# Patient Record
Sex: FEMALE | Race: BLACK OR AFRICAN AMERICAN | NOT HISPANIC OR LATINO
[De-identification: names, ages, dates, MRNs, and addresses within clinical notes are randomized per-mention and may not be internally consistent; named-entity substitution may affect disease eponyms.]

---

## 2019-09-26 ENCOUNTER — APPOINTMENT (OUTPATIENT)
Dept: ANTEPARTUM | Facility: CLINIC | Age: 29
End: 2019-09-26

## 2019-09-26 ENCOUNTER — APPOINTMENT (OUTPATIENT)
Dept: MATERNAL FETAL MEDICINE | Facility: CLINIC | Age: 29
End: 2019-09-26
Payer: MEDICAID

## 2019-09-26 ENCOUNTER — ASOB RESULT (OUTPATIENT)
Age: 29
End: 2019-09-26

## 2019-09-26 ENCOUNTER — APPOINTMENT (OUTPATIENT)
Dept: ANTEPARTUM | Facility: CLINIC | Age: 29
End: 2019-09-26
Payer: MEDICAID

## 2019-09-26 ENCOUNTER — APPOINTMENT (OUTPATIENT)
Dept: MATERNAL FETAL MEDICINE | Facility: CLINIC | Age: 29
End: 2019-09-26

## 2019-09-26 PROCEDURE — 99214 OFFICE O/P EST MOD 30 MIN: CPT

## 2019-09-26 PROCEDURE — 76805 OB US >/= 14 WKS SNGL FETUS: CPT

## 2019-10-01 ENCOUNTER — APPOINTMENT (OUTPATIENT)
Dept: ANTEPARTUM | Facility: CLINIC | Age: 29
End: 2019-10-01

## 2019-10-02 LAB
2ND TRIMESTER DATA: NORMAL
AFP PNL SERPL: NORMAL
AFP SERPL-ACNC: NORMAL
B-HCG FREE SERPL-MCNC: NORMAL
CLINICAL BIOCHEMIST REVIEW: NORMAL
INHIBIN A SERPL-MCNC: NORMAL
NOTES NTD: NORMAL
U ESTRIOL SERPL-SCNC: NORMAL

## 2019-10-29 ENCOUNTER — ASOB RESULT (OUTPATIENT)
Age: 29
End: 2019-10-29

## 2019-10-29 ENCOUNTER — APPOINTMENT (OUTPATIENT)
Dept: ANTEPARTUM | Facility: CLINIC | Age: 29
End: 2019-10-29
Payer: MEDICAID

## 2019-10-29 PROCEDURE — 76805 OB US >/= 14 WKS SNGL FETUS: CPT

## 2019-11-15 ENCOUNTER — EMERGENCY (EMERGENCY)
Facility: HOSPITAL | Age: 29
LOS: 0 days | Discharge: PSYCHIATRIC OR SUBSTANCE USE FACILITY | End: 2019-11-15
Attending: EMERGENCY MEDICINE
Payer: MEDICAID

## 2019-11-15 ENCOUNTER — INPATIENT (INPATIENT)
Facility: HOSPITAL | Age: 29
LOS: 0 days | Discharge: HOME | DRG: 833 | End: 2019-11-15
Attending: OBSTETRICS & GYNECOLOGY | Admitting: INTERNAL MEDICINE
Payer: MEDICAID

## 2019-11-15 ENCOUNTER — TRANSCRIPTION ENCOUNTER (OUTPATIENT)
Age: 29
End: 2019-11-15

## 2019-11-15 VITALS
RESPIRATION RATE: 16 BRPM | SYSTOLIC BLOOD PRESSURE: 105 MMHG | HEART RATE: 97 BPM | DIASTOLIC BLOOD PRESSURE: 69 MMHG | OXYGEN SATURATION: 100 %

## 2019-11-15 VITALS
WEIGHT: 154.32 LBS | RESPIRATION RATE: 18 BRPM | HEIGHT: 65 IN | SYSTOLIC BLOOD PRESSURE: 106 MMHG | HEART RATE: 99 BPM | DIASTOLIC BLOOD PRESSURE: 67 MMHG | OXYGEN SATURATION: 99 %

## 2019-11-15 VITALS
TEMPERATURE: 98 F | RESPIRATION RATE: 20 BRPM | HEART RATE: 101 BPM | SYSTOLIC BLOOD PRESSURE: 109 MMHG | DIASTOLIC BLOOD PRESSURE: 63 MMHG | OXYGEN SATURATION: 99 %

## 2019-11-15 VITALS
OXYGEN SATURATION: 99 % | WEIGHT: 154.98 LBS | HEIGHT: 65 IN | RESPIRATION RATE: 20 BRPM | SYSTOLIC BLOOD PRESSURE: 125 MMHG | TEMPERATURE: 98 F | HEART RATE: 199 BPM | DIASTOLIC BLOOD PRESSURE: 69 MMHG

## 2019-11-15 DIAGNOSIS — R79.89 OTHER SPECIFIED ABNORMAL FINDINGS OF BLOOD CHEMISTRY: ICD-10-CM

## 2019-11-15 DIAGNOSIS — I45.6 PRE-EXCITATION SYNDROME: ICD-10-CM

## 2019-11-15 DIAGNOSIS — J45.909 UNSPECIFIED ASTHMA, UNCOMPLICATED: ICD-10-CM

## 2019-11-15 DIAGNOSIS — I47.1 SUPRAVENTRICULAR TACHYCARDIA: ICD-10-CM

## 2019-11-15 DIAGNOSIS — R00.2 PALPITATIONS: ICD-10-CM

## 2019-11-15 LAB
ALBUMIN SERPL ELPH-MCNC: 2.8 G/DL — LOW (ref 3.3–5)
ALBUMIN SERPL ELPH-MCNC: 3.3 G/DL — SIGNIFICANT CHANGE UP (ref 3.3–5)
ALP SERPL-CCNC: 43 U/L — SIGNIFICANT CHANGE UP (ref 40–120)
ALP SERPL-CCNC: 48 U/L — SIGNIFICANT CHANGE UP (ref 40–120)
ALT FLD-CCNC: 13 U/L — SIGNIFICANT CHANGE UP (ref 10–45)
ALT FLD-CCNC: 20 U/L — SIGNIFICANT CHANGE UP (ref 12–78)
ANION GAP SERPL CALC-SCNC: 12 MMOL/L — SIGNIFICANT CHANGE UP (ref 5–17)
ANION GAP SERPL CALC-SCNC: 6 MMOL/L — SIGNIFICANT CHANGE UP (ref 5–17)
APTT BLD: 25 SEC — LOW (ref 27.5–36.3)
APTT BLD: 25.5 SEC — LOW (ref 28.5–37)
AST SERPL-CCNC: 17 U/L — SIGNIFICANT CHANGE UP (ref 15–37)
AST SERPL-CCNC: 21 U/L — SIGNIFICANT CHANGE UP (ref 10–40)
BASOPHILS # BLD AUTO: 0.03 K/UL — SIGNIFICANT CHANGE UP (ref 0–0.2)
BASOPHILS # BLD AUTO: 0.05 K/UL — SIGNIFICANT CHANGE UP (ref 0–0.2)
BASOPHILS NFR BLD AUTO: 0.5 % — SIGNIFICANT CHANGE UP (ref 0–2)
BASOPHILS NFR BLD AUTO: 0.6 % — SIGNIFICANT CHANGE UP (ref 0–2)
BILIRUB SERPL-MCNC: 0.1 MG/DL — LOW (ref 0.2–1.2)
BILIRUB SERPL-MCNC: 0.2 MG/DL — SIGNIFICANT CHANGE UP (ref 0.2–1.2)
BLD GP AB SCN SERPL QL: NEGATIVE — SIGNIFICANT CHANGE UP
BUN SERPL-MCNC: 6 MG/DL — LOW (ref 7–23)
BUN SERPL-MCNC: 8 MG/DL — SIGNIFICANT CHANGE UP (ref 7–23)
CALCIUM SERPL-MCNC: 8.5 MG/DL — SIGNIFICANT CHANGE UP (ref 8.4–10.5)
CALCIUM SERPL-MCNC: 9 MG/DL — SIGNIFICANT CHANGE UP (ref 8.5–10.1)
CHLORIDE SERPL-SCNC: 108 MMOL/L — SIGNIFICANT CHANGE UP (ref 96–108)
CHLORIDE SERPL-SCNC: 110 MMOL/L — HIGH (ref 96–108)
CO2 SERPL-SCNC: 22 MMOL/L — SIGNIFICANT CHANGE UP (ref 22–31)
CO2 SERPL-SCNC: 24 MMOL/L — SIGNIFICANT CHANGE UP (ref 22–31)
CREAT SERPL-MCNC: 0.58 MG/DL — SIGNIFICANT CHANGE UP (ref 0.5–1.3)
CREAT SERPL-MCNC: 0.69 MG/DL — SIGNIFICANT CHANGE UP (ref 0.5–1.3)
EOSINOPHIL # BLD AUTO: 0.11 K/UL — SIGNIFICANT CHANGE UP (ref 0–0.5)
EOSINOPHIL # BLD AUTO: 0.16 K/UL — SIGNIFICANT CHANGE UP (ref 0–0.5)
EOSINOPHIL NFR BLD AUTO: 1.7 % — SIGNIFICANT CHANGE UP (ref 0–6)
EOSINOPHIL NFR BLD AUTO: 2.1 % — SIGNIFICANT CHANGE UP (ref 0–6)
GLUCOSE BLDC GLUCOMTR-MCNC: 67 MG/DL — LOW (ref 70–99)
GLUCOSE BLDC GLUCOMTR-MCNC: 68 MG/DL — LOW (ref 70–99)
GLUCOSE SERPL-MCNC: 73 MG/DL — SIGNIFICANT CHANGE UP (ref 70–99)
GLUCOSE SERPL-MCNC: 92 MG/DL — SIGNIFICANT CHANGE UP (ref 70–99)
HCT VFR BLD CALC: 32.5 % — LOW (ref 34.5–45)
HCT VFR BLD CALC: 36.2 % — SIGNIFICANT CHANGE UP (ref 34.5–45)
HGB BLD-MCNC: 11.1 G/DL — LOW (ref 11.5–15.5)
HGB BLD-MCNC: 12 G/DL — SIGNIFICANT CHANGE UP (ref 11.5–15.5)
IMM GRANULOCYTES NFR BLD AUTO: 0.4 % — SIGNIFICANT CHANGE UP (ref 0–1.5)
IMM GRANULOCYTES NFR BLD AUTO: 0.6 % — SIGNIFICANT CHANGE UP (ref 0–1.5)
INR BLD: 0.92 RATIO — SIGNIFICANT CHANGE UP (ref 0.88–1.16)
INR BLD: 0.93 RATIO — SIGNIFICANT CHANGE UP (ref 0.88–1.16)
LYMPHOCYTES # BLD AUTO: 1.7 K/UL — SIGNIFICANT CHANGE UP (ref 1–3.3)
LYMPHOCYTES # BLD AUTO: 2.14 K/UL — SIGNIFICANT CHANGE UP (ref 1–3.3)
LYMPHOCYTES # BLD AUTO: 26.6 % — SIGNIFICANT CHANGE UP (ref 13–44)
LYMPHOCYTES # BLD AUTO: 27.6 % — SIGNIFICANT CHANGE UP (ref 13–44)
MCHC RBC-ENTMCNC: 30.5 PG — SIGNIFICANT CHANGE UP (ref 27–34)
MCHC RBC-ENTMCNC: 31.4 PG — SIGNIFICANT CHANGE UP (ref 27–34)
MCHC RBC-ENTMCNC: 33.1 GM/DL — SIGNIFICANT CHANGE UP (ref 32–36)
MCHC RBC-ENTMCNC: 34.2 GM/DL — SIGNIFICANT CHANGE UP (ref 32–36)
MCV RBC AUTO: 92.1 FL — SIGNIFICANT CHANGE UP (ref 80–100)
MCV RBC AUTO: 92.1 FL — SIGNIFICANT CHANGE UP (ref 80–100)
MONOCYTES # BLD AUTO: 0.55 K/UL — SIGNIFICANT CHANGE UP (ref 0–0.9)
MONOCYTES # BLD AUTO: 0.7 K/UL — SIGNIFICANT CHANGE UP (ref 0–0.9)
MONOCYTES NFR BLD AUTO: 8.6 % — SIGNIFICANT CHANGE UP (ref 2–14)
MONOCYTES NFR BLD AUTO: 9 % — SIGNIFICANT CHANGE UP (ref 2–14)
NEUTROPHILS # BLD AUTO: 3.96 K/UL — SIGNIFICANT CHANGE UP (ref 1.8–7.4)
NEUTROPHILS # BLD AUTO: 4.66 K/UL — SIGNIFICANT CHANGE UP (ref 1.8–7.4)
NEUTROPHILS NFR BLD AUTO: 60.3 % — SIGNIFICANT CHANGE UP (ref 43–77)
NEUTROPHILS NFR BLD AUTO: 62 % — SIGNIFICANT CHANGE UP (ref 43–77)
NRBC # BLD: 0 /100 WBCS — SIGNIFICANT CHANGE UP (ref 0–0)
NRBC # BLD: 0 /100 WBCS — SIGNIFICANT CHANGE UP (ref 0–0)
NT-PROBNP SERPL-SCNC: 413 PG/ML — HIGH (ref 0–300)
PLATELET # BLD AUTO: 224 K/UL — SIGNIFICANT CHANGE UP (ref 150–400)
PLATELET # BLD AUTO: 256 K/UL — SIGNIFICANT CHANGE UP (ref 150–400)
POTASSIUM SERPL-MCNC: 4 MMOL/L — SIGNIFICANT CHANGE UP (ref 3.5–5.3)
POTASSIUM SERPL-MCNC: 4.1 MMOL/L — SIGNIFICANT CHANGE UP (ref 3.5–5.3)
POTASSIUM SERPL-SCNC: 4 MMOL/L — SIGNIFICANT CHANGE UP (ref 3.5–5.3)
POTASSIUM SERPL-SCNC: 4.1 MMOL/L — SIGNIFICANT CHANGE UP (ref 3.5–5.3)
PROT SERPL-MCNC: 6.3 G/DL — SIGNIFICANT CHANGE UP (ref 6–8.3)
PROT SERPL-MCNC: 6.7 GM/DL — SIGNIFICANT CHANGE UP (ref 6–8.3)
PROTHROM AB SERPL-ACNC: 10.4 SEC — SIGNIFICANT CHANGE UP (ref 10–12.9)
PROTHROM AB SERPL-ACNC: 10.6 SEC — SIGNIFICANT CHANGE UP (ref 10–12.9)
RBC # BLD: 3.53 M/UL — LOW (ref 3.8–5.2)
RBC # BLD: 3.93 M/UL — SIGNIFICANT CHANGE UP (ref 3.8–5.2)
RBC # FLD: 12.9 % — SIGNIFICANT CHANGE UP (ref 10.3–14.5)
RBC # FLD: 13.2 % — SIGNIFICANT CHANGE UP (ref 10.3–14.5)
RH IG SCN BLD-IMP: POSITIVE — SIGNIFICANT CHANGE UP
SODIUM SERPL-SCNC: 140 MMOL/L — SIGNIFICANT CHANGE UP (ref 135–145)
SODIUM SERPL-SCNC: 142 MMOL/L — SIGNIFICANT CHANGE UP (ref 135–145)
TROPONIN I SERPL-MCNC: 0.14 NG/ML — HIGH (ref 0.01–0.04)
TROPONIN T, HIGH SENSITIVITY RESULT: 42 NG/L — SIGNIFICANT CHANGE UP (ref 0–51)
TSH SERPL-MCNC: 2.35 UIU/ML — SIGNIFICANT CHANGE UP (ref 0.36–3.74)
WBC # BLD: 6.39 K/UL — SIGNIFICANT CHANGE UP (ref 3.8–10.5)
WBC # BLD: 7.74 K/UL — SIGNIFICANT CHANGE UP (ref 3.8–10.5)
WBC # FLD AUTO: 6.39 K/UL — SIGNIFICANT CHANGE UP (ref 3.8–10.5)
WBC # FLD AUTO: 7.74 K/UL — SIGNIFICANT CHANGE UP (ref 3.8–10.5)

## 2019-11-15 PROCEDURE — 93010 ELECTROCARDIOGRAM REPORT: CPT

## 2019-11-15 PROCEDURE — 86900 BLOOD TYPING SEROLOGIC ABO: CPT

## 2019-11-15 PROCEDURE — 83880 ASSAY OF NATRIURETIC PEPTIDE: CPT

## 2019-11-15 PROCEDURE — 82962 GLUCOSE BLOOD TEST: CPT

## 2019-11-15 PROCEDURE — 99285 EMERGENCY DEPT VISIT HI MDM: CPT

## 2019-11-15 PROCEDURE — 86850 RBC ANTIBODY SCREEN: CPT

## 2019-11-15 PROCEDURE — 80053 COMPREHEN METABOLIC PANEL: CPT

## 2019-11-15 PROCEDURE — 93005 ELECTROCARDIOGRAM TRACING: CPT

## 2019-11-15 PROCEDURE — 86901 BLOOD TYPING SEROLOGIC RH(D): CPT

## 2019-11-15 PROCEDURE — 84484 ASSAY OF TROPONIN QUANT: CPT

## 2019-11-15 PROCEDURE — 85027 COMPLETE CBC AUTOMATED: CPT

## 2019-11-15 PROCEDURE — G0378: CPT

## 2019-11-15 PROCEDURE — 99231 SBSQ HOSP IP/OBS SF/LOW 25: CPT

## 2019-11-15 PROCEDURE — 85610 PROTHROMBIN TIME: CPT

## 2019-11-15 PROCEDURE — 99285 EMERGENCY DEPT VISIT HI MDM: CPT | Mod: 25

## 2019-11-15 PROCEDURE — 85730 THROMBOPLASTIN TIME PARTIAL: CPT

## 2019-11-15 RX ORDER — FOLIC ACID 0.8 MG
1 TABLET ORAL DAILY
Refills: 0 | Status: DISCONTINUED | OUTPATIENT
Start: 2019-11-15 | End: 2019-11-15

## 2019-11-15 RX ORDER — SODIUM CHLORIDE 9 MG/ML
1000 INJECTION INTRAMUSCULAR; INTRAVENOUS; SUBCUTANEOUS ONCE
Refills: 0 | Status: COMPLETED | OUTPATIENT
Start: 2019-11-15 | End: 2019-11-15

## 2019-11-15 RX ADMIN — SODIUM CHLORIDE 33.33 MILLILITER(S): 9 INJECTION INTRAMUSCULAR; INTRAVENOUS; SUBCUTANEOUS at 03:55

## 2019-11-15 NOTE — DISCHARGE NOTE PROVIDER - NSDCFUSCHEDAPPT_GEN_ALL_CORE_FT
MAK FREEMAN ; 12/16/2019 ; NPP Cardio Electro [address] MAK Christy ; 12/19/2019 ; NPP Antepartum [address]  MAK FREEMAN ; 01/22/2020 ; NPP Antepartum [address]

## 2019-11-15 NOTE — ED ADULT NURSE NOTE - OBJECTIVE STATEMENT
Patient presents in ED complaining of palpitations x 6hrs, Patient is pregnant, , HASMUKH 3/16/20. Patient confirms fetal movements , denies any abdominal pain or cramping.

## 2019-11-15 NOTE — H&P ADULT - PROBLEM SELECTOR PLAN 1
- Appreciate EPS recs: tele monitoring, vagal maneuvers, TTE, TSH, K>4, Mg >2  - Daily FH checks  - PNVs, folic acid  - MFM following    SWATHI Jones PGY2  d/w T Shannan PGY3  Dr. Frederick mccall

## 2019-11-15 NOTE — H&P ADULT - HISTORY OF PRESENT ILLNESS
29 y/o  at 22w4d (EDC 3/16/19) tx from [place] for SVT. Patient with known hx of WPW syndrome diagnosed 2 years ago after 5 minute episodes of palpitations, seen by unknown cardiologist, had stress test reportedly normal. No medications since. No symptoms since. Patient had episode of tachycardia last night at 9pm that persisted, also with dizziness and SOB otherwise no associated symptoms, went to the ED at 2am, ECG showed SVT with short RP to 190s. Symptoms resolved with conservative management blowing into syringe and IVF around 5am. No medications pushed per patient. Patient currently denies dizziness, fevers, chills, headache, chest pain, palpitations, shortness of breath, abdominal pain, urinary symptoms, changes in bowel movements. Patient denies OB complaints of vaginal bleeding, contractions, loss of fluid. Reports positive fetal movement. Fetal heart rate check performed in ED, found to be 141. Patient reports no complications this pregnancy with patient, fetus, placenta.     Name of GYN Physician: Dr. Prabhjot Calderon     OBHx: current  GYNHx: +fibroids unknown size/location. Denies cysts, endometriosis, STI's, Abnormal pap smears   PMH: WPW (Neyda-Parkinson-White syndrome), ashtma  PSH: denies  Meds: PNVs  All: NKDA  Soc: no substance use, anxiety/depression    accepts blood

## 2019-11-15 NOTE — CHART NOTE - NSCHARTNOTEFT_GEN_A_CORE
@ 22+4 HASMUKH 3/16/20 (dating by LMP) presents due to SVT.    Pt seen at bedside. Pt reports she was first diagnosed with WPW in 2016 with episodes of tachycardia. She states she saw a cardiologist that time and was diagnosed with EKG/stress test. Pt reports since then she had one episode which resolved on its own after 5 min. Last night pt states the episode of palpitations did not resolve after hours so she went to the ED. In [facility] ED conservative measures such as IV fluids and vasal maneuvers were performed with resolution of SVT. Pt was transferred to [facility] for telemetry    PNC: uncomplicated  PMH: WPW  PSH: norma     @ 22+4 HASMUKH 3/16/20 (dating by LMP) presents due to SVT. Spoke with EP fellow who reports no events on tele and that pt is safe for d/c with f/u in 4 weeks with cardiology.  - interventions: from OB perspective ok to receive metoprolol or labetalol if needed also adenosine IVP safe in pregnancy. Recommend use of vagal maneuvers.   -recommend to f/u with her outpatient OBGYN for f/u ultrasound and routine prenatal visits    TLal PGY3  seen w Dr Mack

## 2019-11-15 NOTE — ED PROVIDER NOTE - PHYSICAL EXAMINATION
Gen: Alert, NAD, speaking in complete sentences  Head: NC, AT, EOMI, normal lids/conjunctiva  ENT: normal hearing, patent oropharynx, MMM  Neck: supple, no tenderness/meningismus, palpitations, Trachea midline  Pulm: Bilateral clear BS, normal resp effort, no wheeze/stridor/retractions  CV: RRR, no M/R/G, +dist pulses  Abd: soft, gravid, NT/ND, +BS, no guarding/rebound tenderness, bedside sono +fetal movements,   Mskel: no edema/erythema/cyanosis  Skin: no rash  Neuro: no sensory/motor deficits

## 2019-11-15 NOTE — H&P ADULT - ASSESSMENT
27 y/o  at 22w4d (EDC 3/16/19) w/ hx WPW (no meds) tx from [place] for SVT to 190s, resolved with conservative measures prior to transfer. VSS, exam benign, labs unremarkable. Will admit for tele monitoring.

## 2019-11-15 NOTE — DISCHARGE NOTE PROVIDER - NSDCFUADDINST_GEN_ALL_CORE_FT
vagal maneuvers if you experience additional episodes- breathing into straw    f/u with Dr Shen in 4 weeks. vagal maneuvers if you experience additional episodes- breathing into straw, bearing down, carotid massage, cold water to face      f/u with Dr Shen in 4 weeks.

## 2019-11-15 NOTE — ED ADULT NURSE NOTE - OBJECTIVE STATEMENT
28 year old female, a+ox4, BIBA as a transfer from [facility] for cardiology.  Pt is 22 weeks pregnant, first pregnancy, went to [place] at 3am stating she was having palpitations after eating dinner on 11/14/19.  Reports at time of palpitations she was nauseous and felt lightheaded.  Pt was found to be in SVT at [facility] ED and Adenosine was discussed with patient and patient converted to NSR. Pt transferred here for cardiology.  Pt is asymptomatic currently. Denies chest pain, sob, fevers, cough, headaches, swelling in her feet, or recent illness/fevers. No foreign travel recently. Lungs CTA, unlabored. Abdomen consistent with gravid abdomen. Moves all extremities x4. Denies vaginal bleeding or lower back pain.  Skin warm, dry, intact. 28 year old female, a+ox4, BIBA as a transfer from [place] for cardiology.  Pt is 22 weeks pregnant, first pregnancy, went to [place] at 3am stating she was having palpitations after eating dinner on 11/14/19.  Reports at time of palpitations she was nauseous and felt lightheaded.  Pt was found to be in SVT at [place] ED and Adenosine was discussed with patient and patient converted to NSR. Pt transferred here for cardiology.  Pt is asymptomatic currently. Denies chest pain, sob, fevers, cough, headaches, swelling in her feet, or recent illness/fevers. No foreign travel recently. Lungs CTA, unlabored. Abdomen consistent with gravid abdomen. Moves all extremities x4. Denies vaginal bleeding or lower back pain.  Skin warm, dry, intact. Cardiology paged, on cardiac monitor.

## 2019-11-15 NOTE — ED PROVIDER NOTE - OBJECTIVE STATEMENT
28F  22w pregnant by US tx from [place] for svt. Patient presented to osh with palpitations and was noted to have SVT to 190s. In the past, she typically breaks without meds within a few minutes but had prolonged tachycardia for hours this time. Patient eventually broke at OSH without adenosine. Denies cp, sob, dizziness, abd pain, vag bleeding. FHR OSH documented at 138

## 2019-11-15 NOTE — ED PROVIDER NOTE - CLINICAL SUMMARY MEDICAL DECISION MAKING FREE TEXT BOX
Pt w SVT, no improvement w initial vagal maneuvers in ED.  While discussing adenosine, pt returned to NSR.  Bedside sono done +fetal movements w .  Labs remarkable for elevated trop.  Unable to admit pt to tele at [facility] due to no Ob availability.  Contacted transfer center Pt w SVT, no improvement w initial vagal maneuvers in ED.  While discussing adenosine, pt returned to NSR.  Bedside sono done +fetal movements w .  Labs remarkable for elevated trop.  Unable to admit pt to tele at [facility] due to no Ob availability.  Contacted transfer center, discussed w Dr. Barry cardiology fellow, rec transfer to ED.  Endorsed to Dr. Gonzalez in ED.

## 2019-11-15 NOTE — CONSULT NOTE ADULT - SUBJECTIVE AND OBJECTIVE BOX
Patient seen and evaluated at bedside    Chief Complaint: Palpitations    HPI: 29 yo F with history of WPW diagnosed in 2016 who was transferred from [place] with episode of sustained SVT to 190s. Patient reports history of WPW first diagnosed in , has had episodes of palpitations ~5 minutes that resolve without intervention, happens infrequently about once a year. Saw a cardiologist (unsure if in [facility] system) who performed echo and stress test at time that was reportedly normal. Ablation and pharmacological therapy was discussed, which patient deferred due to infrequency of symptoms.  Patient currently pregnant at 23 weeks gestational age, reports feeling palpitations with some dyspnea and lightheadedness around 9 PM last night that continued while going to sleep, woke up with continued symptoms around 2 AM and presented to [facility] ED. At ED, ECG showed SVT with short RP to 190s, per patient blew into a syringe which broke the rhythm. Denied ever having syncope. No recent illness, no medications except prenatal vitamins, no toxic habits, although has been drinking a lot of Pepsi for reflux. This is the first episode of symptomatic palpitations this year.    PMHx:   Pregnancy    PSHx:   No significant past surgical history      Allergies:  No Known Allergies      Home Meds: prenatal MVI      FAMILY HISTORY: No family history of arrhythmias including WPW    Social History:  Smoking History: Denies  Alcohol Use: Denies  Drug Use: Denies    REVIEW OF SYSTEMS:  CONSTITUTIONAL: No weakness, fevers or chills  EYES/ENT: No visual changes;  No dysphagia  NECK: No pain or stiffness  RESPIRATORY: No cough, wheezing, hemoptysis; +shortness of breath  CARDIOVASCULAR: No chest pain; No lower extremity edema; +palpitations  GASTROINTESTINAL: No abdominal or epigastric pain. No nausea, vomiting, or hematemesis; No diarrhea or constipation. No melena or hematochezia.  BACK: No back pain  GENITOURINARY: No dysuria, frequency or hematuria  NEUROLOGICAL: No numbness or weakness  SKIN: No itching, burning, rashes, or lesions   All other review of systems is negative unless indicated above.    Physical Exam:  T(F): 98.9 (11-15), Max: 98.9 (11-15)  HR: 84 (11-15) (84 - 101)  BP: 110/79 (11-15) (106/67 - 118/91)  RR: 16 (11-15)  SpO2: 99% (11-15)  GENERAL: No acute distress, well-developed, breathing comfortably in room air  HEAD:  Atraumatic, Normocephalic  ENT: EOMI, conjunctiva and sclera clear, Neck supple, No JVD, moist mucosa  CHEST/LUNG: Clear to auscultation bilaterally; No wheeze, equal breath sounds bilaterally   HEART: Regular rate and rhythm; normal S1 and S2, no murmurs, rubs  ABDOMEN: Soft, gravid abdomen  EXTREMITIES:  No clubbing, cyanosis, or edema  PSYCH: Nl behavior, nl affect  NEUROLOGY: AAOx3, non-focal, cranial nerves grossly intact  SKIN: Normal color, No rashes or lesions    Cardiovascular Diagnostic Testing:    ECst ECG: SVT with short RP interval, positive in inferior leads  2nd ECG: sinus rhythm with short NJ <120, delta waves    Echo: none available    Labs: Personally reviewed                        .   6.39  )-----------( 224      ( 15 Nov 2019 08:24 )             32.5     11-15    142  |  108  |  6<L>  ----------------------------<  73  4.0   |  22  |  0.58    Ca    8.5      15 Nov 2019 08:24    TPro  6.3  /  Alb  3.3  /  TBili  0.2  /  DBili  x   /  AST  21  /  ALT  13  /  AlkPhos  43  11-15    PT/INR - ( 15 Nov 2019 08:24 )   PT: 10.6 sec;   INR: 0.92 ratio         PTT - ( 15 Nov 2019 08:24 )  PTT:25.0 sec    CARDIAC MARKERS ( 15 Nov 2019 08:24 )  42 ng/L / x     / x     / x     / x     / x            Serum Pro-Brain Natriuretic Peptide: 413 pg/mL (11-15 @ 08:24) Patient seen and evaluated at bedside    Chief Complaint: Palpitations    HPI: 27 yo F with history of WPW diagnosed in 2016 who was transferred from [place] with episode of sustained SVT to 190s. Patient reports history of WPW first diagnosed in , has had episodes of palpitations ~5 minutes that resolve without intervention, happens infrequently about once a year. Saw a cardiologist (unsure if in [facility] system) who performed echo and stress test at time that was reportedly normal. Ablation and pharmacological therapy was discussed, which patient deferred due to infrequency of symptoms.  Patient currently pregnant at 23 weeks gestational age, reports feeling palpitations with some dyspnea and lightheadedness around 9 PM last night that continued while going to sleep, woke up with continued symptoms around 2 AM and presented to [facility] ED. At ED, ECG showed SVT with medium RP to 190s, per patient blew into a syringe which broke the rhythm. Denied ever having syncope. No recent illness, no medications except prenatal vitamins, no toxic habits, although has been drinking a lot of Pepsi for reflux. This is the first episode of symptomatic palpitations this year.    PMHx:   Pregnancy    PSHx:   No significant past surgical history      Allergies:  No Known Allergies      Home Meds: prenatal MVI      FAMILY HISTORY: No family history of arrhythmias including WPW    Social History:  Smoking History: Denies  Alcohol Use: Denies  Drug Use: Denies    REVIEW OF SYSTEMS:  CONSTITUTIONAL: No weakness, fevers or chills  EYES/ENT: No visual changes;  No dysphagia  NECK: No pain or stiffness  RESPIRATORY: No cough, wheezing, hemoptysis; +shortness of breath  CARDIOVASCULAR: No chest pain; No lower extremity edema; +palpitations  GASTROINTESTINAL: No abdominal or epigastric pain. No nausea, vomiting, or hematemesis; No diarrhea or constipation. No melena or hematochezia.  BACK: No back pain  GENITOURINARY: No dysuria, frequency or hematuria  NEUROLOGICAL: No numbness or weakness  SKIN: No itching, burning, rashes, or lesions   All other review of systems is negative unless indicated above.    Physical Exam:  T(F): 98.9 (11-15), Max: 98.9 (11-15)  HR: 84 (11-15) (84 - 101)  BP: 110/79 (11-15) (106/67 - 118/91)  RR: 16 (11-15)  SpO2: 99% (11-15)  GENERAL: No acute distress, well-developed, breathing comfortably in room air  HEAD:  Atraumatic, Normocephalic  ENT: EOMI, conjunctiva and sclera clear, Neck supple, No JVD, moist mucosa  CHEST/LUNG: Clear to auscultation bilaterally; No wheeze, equal breath sounds bilaterally   HEART: Regular rate and rhythm; normal S1 and S2, no murmurs, rubs  ABDOMEN: Soft, gravid abdomen  EXTREMITIES:  No clubbing, cyanosis, or edema  PSYCH: Nl behavior, nl affect  NEUROLOGY: AAOx3, non-focal, cranial nerves grossly intact  SKIN: Normal color, No rashes or lesions    Cardiovascular Diagnostic Testing:    ECst ECG: SVT with medium RP interval  2nd ECG: sinus rhythm with short SD <120, delta waves, positive in lead I and negative in V1    Echo: none available    Labs: Personally reviewed                        11.   6.39  )-----------( 224      ( 15 Nov 2019 08:24 )             32.5     11-15    142  |  108  |  6<L>  ----------------------------<  73  4.0   |  22  |  0.58    Ca    8.5      15 Nov 2019 08:24    TPro  6.3  /  Alb  3.3  /  TBili  0.2  /  DBili  x   /  AST  21  /  ALT  13  /  AlkPhos  43  11-15    PT/INR - ( 15 Nov 2019 08:24 )   PT: 10.6 sec;   INR: 0.92 ratio         PTT - ( 15 Nov 2019 08:24 )  PTT:25.0 sec    CARDIAC MARKERS ( 15 Nov 2019 08:24 )  42 ng/L / x     / x     / x     / x     / x            Serum Pro-Brain Natriuretic Peptide: 413 pg/mL (11-15 @ 08:24)

## 2019-11-15 NOTE — H&P ADULT - NSHPLABSRESULTS_GEN_ALL_CORE
11.1   6.39  )-----------( 224      ( 15 Nov 2019 08:24 )             32.5     11-15    142  |  108  |  6<L>  ----------------------------<  73  4.0   |  22  |  0.58    Ca    8.5      15 Nov 2019 08:24    TPro  6.3  /  Alb  3.3  /  TBili  0.2  /  DBili  x   /  AST  21  /  ALT  13  /  AlkPhos  43  11-15

## 2019-11-15 NOTE — ED PROVIDER NOTE - CLINICAL SUMMARY MEDICAL DECISION MAKING FREE TEXT BOX
Patient is well appearing and hemodyncamically stable with reassuring exam. Not in SVT right now. repeat labs, Will c/s cards/ep and a/m Patient is well appearing and hemodyncamically stable with reassuring exam. Not in SVT right now. repeat labs, Will c/s cards/ep and a/m  ATTG: : transferred for concern  of elevated hr which has improved. will check for causes including but not limited to Xray, labs, check cardio eval. ivf, re eval for dispo.

## 2019-11-15 NOTE — DISCHARGE NOTE PROVIDER - HOSPITAL COURSE
Pt seen at bedside. Pt reports she was first diagnosed with WPW in 2016 with episodes of tachycardia. She states she saw a cardiologist that time and was diagnosed with EKG/stress test. Pt reports since then she had one episode which resolved on its own after 5 min. Last night pt states the episode of palpitations did not resolve after hours so she went to the ED. In [facility] ED conservative measures such as IV fluids and vasal maneuvers were performed with resolution of SVT. Pt was transferred to [facility] for telemetry        After discussion with cardiology, pt deemed safe for discharge with f/u with cardiology in 4 weeks

## 2019-11-15 NOTE — ED ADULT TRIAGE NOTE - CHIEF COMPLAINT QUOTE
Pt c/o palpitation x 6 hours. Pt states she is 24 weeks pregnant. pt denies taking anything, beside prenatal at 2100. HASMUKH (2020)  ( A0)

## 2019-11-15 NOTE — DISCHARGE NOTE NURSING/CASE MANAGEMENT/SOCIAL WORK - PATIENT PORTAL LINK FT
You can access the FollowMyHealth Patient Portal offered by [facility] by registering at the following website: [email or web address]. By joining [employer]’s FollowMyHealth portal, you will also be able to view your health information using other applications (apps) compatible with our system.

## 2019-11-15 NOTE — ED ADULT NURSE NOTE - NSIMPLEMENTINTERV_GEN_ALL_ED
Implemented All Universal Safety Interventions:  [employer] to call system. Call bell, personal items and telephone within reach. Instruct patient to call for assistance. Room bathroom lighting operational. Non-slip footwear when patient is off stretcher. Physically safe environment: no spills, clutter or unnecessary equipment. Stretcher in lowest position, wheels locked, appropriate side rails in place.

## 2019-11-15 NOTE — DISCHARGE NOTE PROVIDER - CARE PROVIDER_API CALL
Jorge Shen)  Cardiac Electrophysiology; Cardiology; Internal Medicine  [address] [place] [address]  Phone: [phone]  Follow Up Time:     Prabhjot Chaudhari ()  Obstetrics and Gynecology  [address] [place] [id number]  Phone: [phone]  Fax: [phone]  Follow Up Time:

## 2019-11-15 NOTE — ED PROVIDER NOTE - PROGRESS NOTE DETAILS
Patient evaluated by EP. Also spoke to Richelle olson from [facility] - will follow patient during admission. Patient is hemodynamically stable and well appearing with no complaints.

## 2019-11-15 NOTE — DISCHARGE NOTE PROVIDER - CARE PROVIDERS DIRECT ADDRESSES
[email or web address]

## 2019-11-15 NOTE — ED ADULT NURSE REASSESSMENT NOTE - NS ED NURSE REASSESS COMMENT FT1
Report received from Nya ROGERS. Patient resting comfortably in stretcher bed. No acute distress. Admitted to tele.

## 2019-11-15 NOTE — DISCHARGE NOTE PROVIDER - NSDCCPCAREPLAN_GEN_ALL_CORE_FT
PRINCIPAL DISCHARGE DIAGNOSIS  Diagnosis: WPW (Neyda-Parkinson-White syndrome)  Assessment and Plan of Treatment:

## 2019-11-15 NOTE — ED PROVIDER NOTE - OBJECTIVE STATEMENT
Pertinent PMH/PSH/FHx/SHx and Review of Systems contained within:    29yo F w PMH of WPW, not on meds, asthma,  22wk4d preg (HASMUKH 3/16/20) presents to ED for eval of palpitations.  Pt states sx started about 9pm, while eating [employer].  Pt states sx usually last 5min, resolve w/o intervention.  Pt states sx persisted for few hours, came to ED for eval.  Denies fever, chills, CP, syncope, abd pain, vaginal bleeding/discharge.  Pt denies caffeine use, but states she does drink Pepsi.    No fever/chills, No photophobia/eye pain/changes in vision, No ear pain/sore throat/dysphagia, No chest pain, +palpitations, no SOB/cough/wheeze/stridor, No abdominal pain, No neck/back pain, no rash, no changes in neurological status/function.

## 2019-11-15 NOTE — CONSULT NOTE ADULT - ASSESSMENT
27 yo F with history of WPW diagnosed in 2016 (deferred ablation previously) who presented from OSH with episode of sustained SVT to 190s, with short RP suggestive of AVNRT. Converted after vagal maneuvers, currently hemodynamically stable in sinus rhythm. EP consulted for further evaluation    #WPW  #Episode of sustained AVNRT  #Pregnant at 23 week gestational age  - Per patient, felt palpitations for ~5 hours prior to presentation to ED, found to be in SVT to 190s  - Saw cardiologist two years ago and diagnosed with WPW, had echo and stress test that were normal per patient, deferred medications and ablation at time due to infrequency of symptoms  - Recommend monitoring on telemetry for recurrence of SVT  - Counseled on use of vagal maneuvers if patient symptomatic again; if sustained SVT unresponsive to physical provocation, may require pharmacologic therapy  - Would recommend OB consult to comment on safe use of IV adenosine/beta blockers or antiarrhythmic therapy, such as sotalol (class B) and flecainide (class C) in pregnancy  - Given current pregnancy, will need to discuss safety of ablation  - Please obtain TTE to rule out structural heart disease  - Please check TSH  - K > 4, Mg > 2    Incomplete note, to be discussed with attending.    Mica Ambrosio MD  Cardiology Fellow  [phone]  All Cardiology service information can be found 24/7 on [email or web address], password: [employer] 29 yo F with history of WPW diagnosed in 2016 (deferred ablation previously) who presented from OSH with episode of sustained SVT to 190s, with short RP suggestive of AVNRT. Converted after vagal maneuvers, currently hemodynamically stable in sinus rhythm. EP consulted for further evaluation    #WPW  #Episode of SVT, likely ORT  #Pregnant at 23 week gestational age  - Per patient, felt palpitations for ~5 hours prior to presentation to ED, found to be in SVT to 190s  - Saw cardiologist two years ago and diagnosed with WPW, had echo and stress test that were normal per patient, deferred medications and ablation at time due to infrequency of symptoms  - Counseled patient on use of vagal maneuvers if patient symptomatic again, including bearing down, carotid massage, cold water to face  - No contraindication to discharge from EP standpoint, patient has appointment with Dr. Shen on 12/16/19 at 11:40 AM, business card provided  - If recurrent sustained SVT unresponsive to physical provocation, may require pharmacologic therapy, will follow up as outpatient  - Would not consider ablation while pregnant unless all of above fails, otherwise will plan for ablation after delivery    Patient seen and discussed with attending.    Mica Ambrosio MD  Cardiology Fellow  [phone]  All Cardiology service information can be found 24/7 on [email or web address], password: [email or web address]

## 2019-11-15 NOTE — ED PROVIDER NOTE - MUSCULOSKELETAL, MLM
Spine appears normal, range of motion is not limited, no muscle or joint tenderness 5/5 strength in distale xtremities b/l

## 2019-11-15 NOTE — ED PROVIDER NOTE - ATTENDING CONTRIBUTION TO CARE
29 y/o f with pmhx WPW (no ablation)  EGA 22w pregnant by US tx from [place] (Dr. Martinez) for concern of elevated HR. patient had a heart rate approx 190s on arrival to ED and after discussion about treatment converted to NSR shortly after without and interventions. Denies cp, sob, dizziness, abd pain, vag bleeding. FHR OSH documented at 138. no bleeding. no abd pain. + fetal movement. no chest pain or dizziness.  Had not been to her cardiologist in years. no meds or inventions done en route to [facility].  Gen.  no acute distress  HEENT:  perrl eomi dry lips. moist membranes  Lungs:  b/l bs  CVS: S1S2   Abd;  soft non tender no distention, gravid.   Ext: no pitting edema or erythema  Neuro: aaxo3 no focal deficits  MSK: moving all et spon. strength 5/5 b/l upper and lower ext.

## 2019-11-27 ENCOUNTER — INBOUND DOCUMENT (OUTPATIENT)
Age: 29
End: 2019-11-27

## 2019-12-04 ENCOUNTER — APPOINTMENT (OUTPATIENT)
Dept: ANTEPARTUM | Facility: CLINIC | Age: 29
End: 2019-12-04
Payer: MEDICAID

## 2019-12-04 ENCOUNTER — ASOB RESULT (OUTPATIENT)
Age: 29
End: 2019-12-04

## 2019-12-04 DIAGNOSIS — D25.9 LEIOMYOMA OF UTERUS, UNSPECIFIED: ICD-10-CM

## 2019-12-04 PROBLEM — Z34.90 ENCOUNTER FOR SUPERVISION OF NORMAL PREGNANCY, UNSPECIFIED, UNSPECIFIED TRIMESTER: Chronic | Status: ACTIVE | Noted: 2019-11-15

## 2019-12-04 PROBLEM — I45.6 PRE-EXCITATION SYNDROME: Chronic | Status: ACTIVE | Noted: 2019-11-15

## 2019-12-04 PROBLEM — J45.909 UNSPECIFIED ASTHMA, UNCOMPLICATED: Chronic | Status: ACTIVE | Noted: 2019-11-15

## 2019-12-04 PROCEDURE — 76816 OB US FOLLOW-UP PER FETUS: CPT

## 2019-12-10 ENCOUNTER — OUTPATIENT (OUTPATIENT)
Dept: OUTPATIENT SERVICES | Age: 29
LOS: 1 days | Discharge: HOME | End: 2019-12-10

## 2019-12-11 ENCOUNTER — APPOINTMENT (OUTPATIENT)
Dept: PEDIATRIC CARDIOLOGY | Facility: CLINIC | Age: 29
End: 2019-12-11

## 2019-12-12 PROBLEM — I47.1 SVT (SUPRAVENTRICULAR TACHYCARDIA): Status: ACTIVE | Noted: 2019-12-12

## 2019-12-12 PROBLEM — I45.6 WPW (WOLFF-PARKINSON-WHITE SYNDROME): Status: ACTIVE | Noted: 2019-12-12

## 2019-12-12 PROBLEM — Z87.09 HISTORY OF ASTHMA: Status: RESOLVED | Noted: 2019-12-12 | Resolved: 2019-12-12

## 2019-12-16 ENCOUNTER — APPOINTMENT (OUTPATIENT)
Dept: ELECTROPHYSIOLOGY | Facility: CLINIC | Age: 29
End: 2019-12-16

## 2019-12-18 ENCOUNTER — INBOUND DOCUMENT (OUTPATIENT)
Age: 29
End: 2019-12-18

## 2019-12-19 ENCOUNTER — APPOINTMENT (OUTPATIENT)
Dept: ANTEPARTUM | Facility: CLINIC | Age: 29
End: 2019-12-19
Payer: MEDICAID

## 2019-12-19 ENCOUNTER — ASOB RESULT (OUTPATIENT)
Age: 29
End: 2019-12-19

## 2019-12-19 DIAGNOSIS — I45.6 PRE-EXCITATION SYNDROME: ICD-10-CM

## 2019-12-19 DIAGNOSIS — Z87.09 PERSONAL HISTORY OF OTHER DISEASES OF THE RESPIRATORY SYSTEM: ICD-10-CM

## 2019-12-19 DIAGNOSIS — I47.1 SUPRAVENTRICULAR TACHYCARDIA: ICD-10-CM

## 2019-12-19 PROCEDURE — 76816 OB US FOLLOW-UP PER FETUS: CPT

## 2020-02-05 ENCOUNTER — APPOINTMENT (OUTPATIENT)
Dept: ANTEPARTUM | Facility: CLINIC | Age: 30
End: 2020-02-05

## 2020-09-16 ENCOUNTER — RESULT REVIEW (OUTPATIENT)
Age: 30
End: 2020-09-16

## 2022-10-22 NOTE — ED ADULT NURSE NOTE - INTEGUMENTARY WDL
Color consistent with ethnicity/race, warm, dry intact, resilient. Detail Level: Simple Detail Level: Detailed